# Patient Record
(demographics unavailable — no encounter records)

---

## 2025-03-21 NOTE — ASSESSMENT
[FreeTextEntry1] : -- # Uveitis and multiple autoimmune conditions.  * Doubt KAELYN or Bechet. Check HLA genetic evaluation. Written informed consent obtained.   # New CKD stage 3 likely due to aging and possibly DM nephropathy. * Doubt oxalate nephropathy or interstitial nephritis.  * Recheck labs, including cystatin C, monoclonal protein evaluation, urinalysis, and urine albumin quantification. * Check renal ultrasound.  * Doubt other causes but will evaluate. * Risks of renal biopsy outweigh benefits currently. * Absolute kidney benefits/risks of SGLT2i in this clinical situation are uncertain. (Lack of albuminuria > 300.)  * Therapies for kidney disease: blood pressure control; other evidence-based therapies recommended including exercise, a plant-based lower oxalate diet, and 400 mcg folic acid daily * Cardiovascular disease prevention: counseling on healthy diet, physical activity, weight loss, alcohol limitation, blood pressure control; cardiology evaluation/followup advised * A counseling information sheet on CKD has been given (which they have been instructed to read). * The patient has been counseled that chronic kidney disease is a significant condition and regular office follow-up with me (at least every 4 months for now) is important for monitoring and their health, and that it is their responsibility to make a follow-up appointment. * The patient has been counseled never to stop taking their medications without discussing it with me or another doctor. * The patient has been counseled on avoiding NSAIDs. * The patient has been counseled on risk of worsening kidney function and instructed to immediately call and speak with me and go immediately to ER with any severe symptoms, nausea, vomiting, diarrhea, chest pain, or shortness of breath.  # Gout. * Cont allupurinol.   # Type 2 DM. * Consider SGLT2i. * Follow up with Dr. Dupree.  --  Coding: This visit qualifies as a [id number] because it involves a new patient with multiple complex chronic conditions, including CKD stage 3, Type 2 Diabetes, Crohn's disease, gout, uveitis, and hyperlipidemia. The visit included a comprehensive history, detailed physical exam, and high-complexity medical decision making. The medical decision making is supported by the extensive differential diagnosis considered for the CKD and uveitis, the ordering and review of numerous labs to evaluate these conditions, the consideration of new medications, and detailed counseling regarding lifestyle modifications, medication adherence, and potential risks. 
None known in lifetime

## 2025-03-21 NOTE — HISTORY OF PRESENT ILLNESS
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   [id number]: Creatinine 1.41, eGFR 52 (avg. of CKD-EPIcr & CKD-EPIcys).   No exposure to chronic NSAIDs, chronic PPIs, green smoothies, creatine, or herbal supplements. No history of kidney stones or pyelonephritis. No recent renal ultrasound. They are unaware of proteinuria or hematuria.  DM present for 14 years. No retinopathy.   BP controlled. No SOB with exertion. No CP. No lightheadedness (pre-syncope). Adherent to medications.   No recent gout.   Colectomy, has ileostomy. Uncertain about small bowel resection. Able to stay hydrated.   Not on therapy for IBD. Had been on sulfa meds in 1970 but had allergic reaction. No infliximab or other therapies.   He had uveitis.  SH: No cigs. Rare ETOH.  FH: No kidney disease.

## 2025-07-23 NOTE — HISTORY OF PRESENT ILLNESS
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   * No genes clearly associated with uveitits. * eGFR 60 (avg. of CKD-EPIcr & CKD-EPIcys). 58 mg albuminuria. * BP controlled. No SOB with exertion. No CP. No lightheadedness (pre-syncope).  * No recent gout (for 20 years).   Previous history (21Mar25): [id number]: Creatinine 1.41, eGFR 52 (avg. of CKD-EPIcr & CKD-EPIcys).   No exposure to chronic NSAIDs, chronic PPIs, green smoothies, creatine, or herbal supplements. No history of kidney stones or pyelonephritis. No recent renal ultrasound. They are unaware of proteinuria or hematuria.  DM present for 14 years. No retinopathy.   BP controlled. No SOB with exertion. No CP. No lightheadedness (pre-syncope). Adherent to medications.   No recent gout.   Colectomy, has ileostomy. Uncertain about small bowel resection. Able to stay hydrated.   Not on therapy for IBD. Had been on sulfa meds in 1970 but had allergic reaction. No infliximab or other therapies.   SH: No cigs. Rare ETOH.  FH: No kidney disease.

## 2025-07-23 NOTE — ASSESSMENT
[FreeTextEntry1] : -- # Uveitis and multiple autoimmune conditions. * Doubt KAELYN or Bechet.   # Borderline CKD stage 2-3 likely due to aging and possibly DM nephropathy. * Recheck labs, including cystatin C,  urinalysis, and urine albumin quantification. * Absolute kidney benefits/risks of SGLT2i in this clinical situation are uncertain. (Lack of albuminuria > 300.) Will defer to Dr. Dupree given therapy with insulin.  * Therapies for kidney disease: blood pressure control; other evidence-based therapies recommended including exercise, a plant-based lower oxalate diet, and 400 mcg folic acid daily * Cardiovascular disease prevention: counseling on healthy diet, physical activity, weight loss, alcohol limitation, blood pressure control; cardiology evaluation/followup advised * A counseling information sheet on CKD has been given (which they have been instructed to read). * The patient has been counseled that chronic kidney disease is a significant condition and regular office follow-up with me (at least every 4 months for now) is important for monitoring and their health, and that it is their responsibility to make a follow-up appointment. * The patient has been counseled never to stop taking their medications without discussing it with me or another doctor. * The patient has been counseled on avoiding NSAIDs. * The patient has been counseled on risk of worsening kidney function and instructed to immediately call and speak with me and go immediately to ER with any severe symptoms, nausea, vomiting, diarrhea, chest pain, or shortness of breath.  # Gout. * Cont allupurinol.  # Type 2 DM. * Consider SGLT2i. * Follow up with Dr. Dupree.  # Possible urolithiasis. * Kidney stone diet prescribed (> 2.5 - 3 L fluids; lower sugar, sodium, salt, oxalate, and protein; increased calcium from foods).  * Recheck US next year.